# Patient Record
(demographics unavailable — no encounter records)

---

## 2025-07-18 NOTE — ASSESSMENT
[FreeTextEntry1] : 64 y/o Female returns for a follow-up visit for osteopenia and hypothyroidism  Patient presented with fairly rapid decrease in bone density after menopause although still in range of osteopenia approaching osteoporosis in the spine. The only obvious risk factor is low vitamin D although the details are not available.  Began Actonel 6/2020. Tolerated well. BMD 07/2022 was improved in femoral neck, consistent with osteopenia. Patient began a drug holiday 07/2022. BMD 07/2023 shows some variability in hip measures but still consistent with osteopenia and low risk for fracture. BMD 07/2024 indicates increased osteopenia in the spine, stable osteopenia in total hip and fem neck and decreased osteopenia in prox. radius. Bone density 07/2025 shows decreased osteopenia in the spine and stable osteopenia in hips and prox radius. BMD reviewed with pt.  Continue drug holiday.  H/o thyroid disease. Clinically euthyroid on Synthroid 75 mg. No local neck pain. No dysphagia or dysphonia. No raciness, shakiness, heat/cold intolerance, tiredness, but occasional fatigue. No palpitations, tremors, or sudden weight gain/loss. H/o thyroid nodules. TUS c/w no distinct nodules.  Hemoglobin A1c: 6.0% shows stable prediabetes. Natural history of prediabetes discussed in detail. Pt advised re: watching weight, maintaining moderate to low carbohydrate intake. Controversy concerning use of metformin for prediabetes reviewed.  Labs: 07/2025 Creatinine: 0.91 Calcium: 9.4 Vitamin D: 53.1 TSH: 0.776 A1c: 6.0% prediabetes Free T4: 1.33   F/u in 2 year and repeat BMD

## 2025-07-18 NOTE — PHYSICAL EXAM
[Alert] : alert [Well Nourished] : well nourished [No Acute Distress] : no acute distress [Well Developed] : well developed [Normal Sclera/Conjunctiva] : normal sclera/conjunctiva [EOMI] : extra ocular movement intact [No Proptosis] : no proptosis [Thyroid Not Enlarged] : the thyroid was not enlarged [No Thyroid Nodules] : no palpable thyroid nodules [Clear to Auscultation] : lungs were clear to auscultation bilaterally [Normal S1, S2] : normal S1 and S2 [Normal Rate] : heart rate was normal [Regular Rhythm] : with a regular rhythm [No Edema] : no peripheral edema [Normal Bowel Sounds] : normal bowel sounds [Not Tender] : non-tender [Not Distended] : not distended [Soft] : abdomen soft [Normal Anterior Cervical Nodes] : no anterior cervical lymphadenopathy [No Spinal Tenderness] : no spinal tenderness [Spine Straight] : spine straight [No Stigmata of Cushings Syndrome] : no stigmata of Cushings Syndrome [Normal Gait] : normal gait [Normal Reflexes] : deep tendon reflexes were 2+ and symmetric [No Tremors] : no tremors [Oriented x3] : oriented to person, place, and time [de-identified] : lumbar scars well healed

## 2025-07-18 NOTE — PHYSICAL EXAM
[Alert] : alert [Well Nourished] : well nourished [No Acute Distress] : no acute distress [Well Developed] : well developed [Normal Sclera/Conjunctiva] : normal sclera/conjunctiva [EOMI] : extra ocular movement intact [No Proptosis] : no proptosis [Thyroid Not Enlarged] : the thyroid was not enlarged [No Thyroid Nodules] : no palpable thyroid nodules [Clear to Auscultation] : lungs were clear to auscultation bilaterally [Normal S1, S2] : normal S1 and S2 [Normal Rate] : heart rate was normal [Regular Rhythm] : with a regular rhythm [No Edema] : no peripheral edema [Normal Bowel Sounds] : normal bowel sounds [Not Tender] : non-tender [Not Distended] : not distended [Soft] : abdomen soft [Normal Anterior Cervical Nodes] : no anterior cervical lymphadenopathy [No Spinal Tenderness] : no spinal tenderness [Spine Straight] : spine straight [No Stigmata of Cushings Syndrome] : no stigmata of Cushings Syndrome [Normal Gait] : normal gait [Normal Reflexes] : deep tendon reflexes were 2+ and symmetric [No Tremors] : no tremors [Oriented x3] : oriented to person, place, and time [de-identified] : lumbar scars well healed

## 2025-07-18 NOTE — PROCEDURE
[FreeTextEntry1] : Bone Mineral Density: 07/17/2025 Indication: vs 2024 to assess response to medication Spine: L1-L3 -1.3 osteopenia, -3.3% Total hip: -1.5 osteopenia, no significant change Femoral neck: -2.0 osteopenia, no significant change Proximal radius: -1.4 osteopenia, no significant change  Bone Mineral Density: 07/10/2024 Indication: Comparison to 2023, prior test showed bone loss  Spine: L1-3, -1.1, osteopenia, +6.4%  Total hip: -1.4, osteopenia, no significant change  Femoral neck: -1.9, osteopenia, no significant change Proximal radius:  -2.2, osteopenia, -5.8%  Bone mineral density: 07/10/2023 indication: Compared to 2020 to assess response to change in medications spine L1-L3, -1.5, osteopenia, no significant change total hip -1.3 osteopenia -3.3% of borderline significance femoral neck -1.8 osteopenia -5.3% although still +4.8% versus 2021. proximal radius -1.6 osteopenia no significant change in   Bone Density July 11, 2022 Indication vs 2021 asses response to medication  Spine L1-L3 -1.7 osteopenia , no significant change  Total Hip-1.1 osteopenia , no significant change  Femoral Neck -1.5 osteopenia + 10.7% Proximal Radius  -1.7 osteopenia , no significant change   Thyroid US - 07/09/2021 slightly full gland R>L, no nodules  Bone mineral density: 07/09/2021  Indication: vs. outside study 2019 Spine: (L1-L3) -1.6 osteopenia, improved, prior study included L4 Total hip: -1.1 osteopenia, no significant change Femoral neck: -2.1 osteopenia, no significant change Proximal radius: -1.7 osteopenia  Thyroid US - 04/07/2020 right lower pole fullness, no distinct nodules  Bone mineral density: 10/2019 images personally reviewed Indication: outside study Spine: -2.3 osteopenia, significantly decreased vs. 2016 and 2014 Total hip: R -1.1 osteopenia Femoral neck: R -1.7 osteopenia, significantly decreased vs. 2016,

## 2025-07-18 NOTE — HISTORY OF PRESENT ILLNESS
[FreeTextEntry1] : Patient returns for a follow up visit for osteoporosis. Began drug holiday 07/2022. No interval health changes. No major surgeries, hospitalizations, fractures or changes in medication. Up to date with dentist. No major dental work planned.  Pt reports taking 500 mg of calcium.  H/o thyroid disease. Clinically euthyroid on Synthroid 75 mcg daily request d.a.w.. No local neck pain. No dysphagia or dysphonia. No raciness, shakiness, heat/cold intolerance, tiredness, or fatigue. No palpitations, tremors, or sudden weight gain/loss. Told of small thyroid nodule, never needed FNA. No h/o RT, amiodarone, lithium. Recent hemoglobin A1c 6.0% Saw dermatology for hair thinning added finasteride and spironolactone, believes symptoms have improved. No history of hirsutism.   Patient states that initial evaluation did not show an elevated testosterone

## 2025-07-18 NOTE — END OF VISIT
[FreeTextEntry3] : This note was written by Sarina Morgan on (July 17, 2025) acting as a medical scribe for Dr. Lehman. This note was authored by the medical scribe for me. I have reviewed, edited, and revised the note as needed. I am in agreement with the exam findings, imaging findings, and treatment plan. Shawn Lehman MD

## 2025-07-18 NOTE — ASSESSMENT
[FreeTextEntry1] : 62 y/o Female returns for a follow-up visit for osteopenia and hypothyroidism  Patient presented with fairly rapid decrease in bone density after menopause although still in range of osteopenia approaching osteoporosis in the spine. The only obvious risk factor is low vitamin D although the details are not available.  Began Actonel 6/2020. Tolerated well. BMD 07/2022 was improved in femoral neck, consistent with osteopenia. Patient began a drug holiday 07/2022. BMD 07/2023 shows some variability in hip measures but still consistent with osteopenia and low risk for fracture. BMD 07/2024 indicates increased osteopenia in the spine, stable osteopenia in total hip and fem neck and decreased osteopenia in prox. radius. Bone density 07/2025 shows decreased osteopenia in the spine and stable osteopenia in hips and prox radius. BMD reviewed with pt.  Continue drug holiday.  H/o thyroid disease. Clinically euthyroid on Synthroid 75 mg. No local neck pain. No dysphagia or dysphonia. No raciness, shakiness, heat/cold intolerance, tiredness, but occasional fatigue. No palpitations, tremors, or sudden weight gain/loss. H/o thyroid nodules. TUS c/w no distinct nodules.  Hemoglobin A1c: 6.0% shows stable prediabetes. Natural history of prediabetes discussed in detail. Pt advised re: watching weight, maintaining moderate to low carbohydrate intake. Controversy concerning use of metformin for prediabetes reviewed.  Labs: 07/2025 Creatinine: 0.91 Calcium: 9.4 Vitamin D: 53.1 TSH: 0.776 A1c: 6.0% prediabetes Free T4: 1.33   F/u in 2 year and repeat BMD